# Patient Record
(demographics unavailable — no encounter records)

---

## 2024-10-29 NOTE — PHYSICAL EXAM
[Normocephalic] : normocephalic [Atraumatic] : atraumatic [EOMI] : extra ocular movement intact [PERRL] : pupils equal, round and reactive to light [Sclera nonicteric] : sclera nonicteric [Supple] : supple [No Supraclavicular Adenopathy] : no supraclavicular adenopathy [Examined in the supine and seated position] : examined in the supine and seated position [Bra Size: ___] : Bra Size: [unfilled] [No dominant masses] : no dominant masses in right breast  [No dominant masses] : no dominant masses left breast [No Nipple Retraction] : no left nipple retraction [No Nipple Discharge] : no left nipple discharge [Breast Mass Right Breast ___cm] : no masses [Breast Mass Left Breast ___cm] : no masses [Breast Nipple Inversion] : nipples not inverted [Breast Nipple Retraction] : nipples not retracted [Breast Nipple Flattening] : nipples not flattened [Breast Nipple Fissures] : nipples not fissured [Breast Abnormal Lactation (Galactorrhea)] : no galactorrhea [Breast Abnormal Secretion Bloody Fluid] : no bloody discharge [Breast Abnormal Secretion Serous Fluid] : no serous discharge [Breast Abnormal Secretion Opalescent Fluid] : no milky discharge [No Axillary Lymphadenopathy] : no left axillary lymphadenopathy [No Edema] : no edema [No Rashes] : no rashes [No Ulceration] : no ulceration [de-identified] : non-labored respirations

## 2024-10-29 NOTE — PHYSICAL EXAM
[Normocephalic] : normocephalic [Atraumatic] : atraumatic [EOMI] : extra ocular movement intact [PERRL] : pupils equal, round and reactive to light [Sclera nonicteric] : sclera nonicteric [Supple] : supple [No Supraclavicular Adenopathy] : no supraclavicular adenopathy [Examined in the supine and seated position] : examined in the supine and seated position [Bra Size: ___] : Bra Size: [unfilled] [No dominant masses] : no dominant masses in right breast  [No dominant masses] : no dominant masses left breast [No Nipple Retraction] : no left nipple retraction [No Nipple Discharge] : no left nipple discharge [Breast Mass Right Breast ___cm] : no masses [Breast Mass Left Breast ___cm] : no masses [Breast Nipple Inversion] : nipples not inverted [Breast Nipple Retraction] : nipples not retracted [Breast Nipple Flattening] : nipples not flattened [Breast Nipple Fissures] : nipples not fissured [Breast Abnormal Lactation (Galactorrhea)] : no galactorrhea [Breast Abnormal Secretion Bloody Fluid] : no bloody discharge [Breast Abnormal Secretion Serous Fluid] : no serous discharge [Breast Abnormal Secretion Opalescent Fluid] : no milky discharge [No Axillary Lymphadenopathy] : no left axillary lymphadenopathy [No Edema] : no edema [No Rashes] : no rashes [No Ulceration] : no ulceration [de-identified] : non-labored respirations

## 2024-10-29 NOTE — DATA REVIEWED
[FreeTextEntry1] : 10/20/2023 B/L SM 10/20/2023 B/L US 8/1/2024 L US 10/18/2024 B/L DM 10/18/2024 B/L US

## 2024-10-29 NOTE — ASSESSMENT
[FreeTextEntry1] : This is a 56 year-old F referred by Dr. Alisson Fermin for evaluation of BIRADS 3 L breast mass, here for initial consultation.   We discussed BIRADS 3 findings, which are considered have a less than or equal to 2% likelihood of malignancy. This category reduces the number of false-positive biopsies and justifies a period of watchful waiting, avoiding unnecessary workup when the likelihood of malignancy is very low. Ultrasound BI-RADS 3 masses will typically undergo a 6-, 12-, and 24-month surveillance protocol to ensure stability and continued benign appearance. After 24 months of stability, the patient may return to routine screening. If during this surveillance period, the mass decreases in size or demonstrates resolution, it can be downgraded to a BI-RADS 2 (benign). If during the surveillance period the mass grows in size or demonstrates suspicious qualities, then the BI-RADS category may be upgraded to a biopsy recommendation.  Recent imaging reviewed, lesion downgraded to BIRADS 2  We discussed breast pain, which is one of the most common breast complaints seen in the primary care setting. Cyclic breast pain accounts for 2/3s of all breast pain cases, most commonly in women 20-30 years old, and is often B/L and diffuse.   We discussed that spontaneous resolution of breast pain is common. The patient was reassured that breast pain is not a risk factor for breast cancer. NSAIDs are appropriate to help with pain.  A well-fitting bra or sports bra may also help with compression and support of the bra and help reduce any breast discomfort.  Caffeine intake, iodine deficiency, and dietary fat intake have not been definitively established as causal factors in breast pain, and dietary modifications are not effective treatments. However, these are low-risk options that may have some benefit.  Ms Castro verbalized her understanding of the plan, and risks/benefits/complications and alternatives were discussed. All questions were answered. She knows to call me if she has any additional questions or concerns.   PLAN: -B/L /US 10/2025 - RTO PRN

## 2024-10-29 NOTE — PAST MEDICAL HISTORY
[Postmenopausal] : The patient is postmenopausal [Menarche Age ____] : age at menarche was [unfilled] [Menopause Age____] : age at menopause was [unfilled] [History of Hormone Replacement Treatment] : has no history of hormone replacement treatment [Total Preg ___] : G[unfilled] [Live Births ___] : P[unfilled]  [Age At Live Birth ___] : Age at live birth: [unfilled] [FreeTextEntry6] : none [FreeTextEntry7] : none [FreeTextEntry8] : 3 years

## 2024-10-29 NOTE — HISTORY OF PRESENT ILLNESS
[FreeTextEntry1] : This is a 56 year-old F referred by Dr. Alisson Fermin for evaluation of BIRADS 3 L breast mass and breast pain, here for initial consultation.  Prior history: The patient reports that she recently started to notice Right breast pain, mild, sharp, needle-like pain in the upper breast. Denies any masses, skin changes, or nipple discharge.  Recent imaging: 10/20/2023 B/L SM (R) revealed heterogeneously dense breasts -b/l benign calcifications -BR2  10/20/2023 B/L US (R) -R neg -L 6:00 N1 6 mm hypoechoic mass--> f/u L US in 6 months -BR3  2024 L US (R) -L 6:00 N1 6 mm hypoechoic mass, stable since 10/20/2023--> f/u B/L DM/US in 2024 -BR3  10/18/2024 B/L DM (R) revealed heterogeneously dense breasts -b/l neg including region of pain upper central R -BR2  10/18/2024 B/L US (R) -L 6:00 N1 6 mm hypoechoic mass, stable -R 1:00-2:00 N8 (area of pain) negative -BR2  PMH: Denies PSH:  Denies Meds: Denies ALL:  Denies SH:  No tobacco. No EtOH. [place] FH:  No breast cancer or other cancers  in 1st or 2nd degree relatives  GYN: Menarche 14. Menopause 45. . Age of first full-term pregnancy 28. Breast-feeding 3 years. OCP none. Fertility none. HRT none
[FreeTextEntry1] : This is a 56 year-old F referred by Dr. Alisson Fermin for evaluation of BIRADS 3 L breast mass and breast pain, here for initial consultation.  Prior history: The patient reports that she recently started to notice Right breast pain, mild, sharp, needle-like pain in the upper breast. Denies any masses, skin changes, or nipple discharge.  Recent imaging: 10/20/2023 B/L SM (R) revealed heterogeneously dense breasts -b/l benign calcifications -BR2  10/20/2023 B/L US (R) -R neg -L 6:00 N1 6 mm hypoechoic mass--> f/u L US in 6 months -BR3  2024 L US (R) -L 6:00 N1 6 mm hypoechoic mass, stable since 10/20/2023--> f/u B/L DM/US in 2024 -BR3  10/18/2024 B/L DM (R) revealed heterogeneously dense breasts -b/l neg including region of pain upper central R -BR2  10/18/2024 B/L US (R) -L 6:00 N1 6 mm hypoechoic mass, stable -R 1:00-2:00 N8 (area of pain) negative -BR2  PMH: Denies PSH:  Denies Meds: Denies ALL:  Denies SH:  No tobacco. No EtOH. [place] FH:  No breast cancer or other cancers  in 1st or 2nd degree relatives  GYN: Menarche 14. Menopause 45. . Age of first full-term pregnancy 28. Breast-feeding 3 years. OCP none. Fertility none. HRT none

## 2024-10-29 NOTE — CONSULT LETTER
[Dear  ___] : Dear  [unfilled], [Consult Letter:] : I had the pleasure of evaluating your patient, [unfilled]. [Please see my note below.] : Please see my note below. [Consult Closing:] : Thank you very much for allowing me to participate in the care of this patient.  If you have any questions, please do not hesitate to contact me. [Sincerely,] : Sincerely, [FreeTextEntry2] : Alisson Fermin MD [FreeTextEntry3] : Josselin Dale MD Breast Surgeon Division of Surgical Oncology Department of Surgery [address] [place] [address] Tel: [phone] Fax: [phone] Email: [email or web address]

## 2024-10-29 NOTE — CONSULT LETTER
[Dear  ___] : Dear  [unfilled], [Consult Letter:] : I had the pleasure of evaluating your patient, [unfilled]. [Please see my note below.] : Please see my note below. [Consult Closing:] : Thank you very much for allowing me to participate in the care of this patient.  If you have any questions, please do not hesitate to contact me. [Sincerely,] : Sincerely, [FreeTextEntry2] : Alisson Fermin MD [FreeTextEntry3] : Josselin Dale MD Breast Surgeon Division of Surgical Oncology Department of Surgery [address] [place] [address] Tel: [phone] Fax: [phone] Email: [email or web address]